# Patient Record
Sex: FEMALE | Race: WHITE | NOT HISPANIC OR LATINO
[De-identification: names, ages, dates, MRNs, and addresses within clinical notes are randomized per-mention and may not be internally consistent; named-entity substitution may affect disease eponyms.]

---

## 2018-07-09 ENCOUNTER — RESULT REVIEW (OUTPATIENT)
Age: 23
End: 2018-07-09

## 2019-11-19 ENCOUNTER — TRANSCRIPTION ENCOUNTER (OUTPATIENT)
Age: 24
End: 2019-11-19

## 2020-09-20 ENCOUNTER — TRANSCRIPTION ENCOUNTER (OUTPATIENT)
Age: 25
End: 2020-09-20

## 2020-12-12 ENCOUNTER — TRANSCRIPTION ENCOUNTER (OUTPATIENT)
Age: 25
End: 2020-12-12

## 2022-08-16 ENCOUNTER — NON-APPOINTMENT (OUTPATIENT)
Age: 27
End: 2022-08-16

## 2022-08-18 ENCOUNTER — NON-APPOINTMENT (OUTPATIENT)
Age: 27
End: 2022-08-18

## 2022-12-21 ENCOUNTER — NON-APPOINTMENT (OUTPATIENT)
Age: 27
End: 2022-12-21

## 2023-04-26 ENCOUNTER — APPOINTMENT (OUTPATIENT)
Dept: OBGYN | Facility: CLINIC | Age: 28
End: 2023-04-26
Payer: COMMERCIAL

## 2023-04-26 VITALS
TEMPERATURE: 98.6 F | HEIGHT: 61 IN | HEART RATE: 70 BPM | WEIGHT: 207 LBS | RESPIRATION RATE: 18 BRPM | DIASTOLIC BLOOD PRESSURE: 74 MMHG | SYSTOLIC BLOOD PRESSURE: 130 MMHG | BODY MASS INDEX: 39.08 KG/M2

## 2023-04-26 DIAGNOSIS — O20.9 HEMORRHAGE IN EARLY PREGNANCY, UNSPECIFIED: ICD-10-CM

## 2023-04-26 LAB
HCG UR QL: POSITIVE
QUALITY CONTROL: YES

## 2023-04-26 PROCEDURE — 36415 COLL VENOUS BLD VENIPUNCTURE: CPT

## 2023-04-26 PROCEDURE — 99203 OFFICE O/P NEW LOW 30 MIN: CPT

## 2023-04-26 RX ORDER — PRENATAL VIT NO.130/IRON/FOLIC 27MG-0.8MG
28-0.8 TABLET ORAL
Refills: 0 | Status: ACTIVE | COMMUNITY
Start: 2023-04-26

## 2023-04-26 NOTE — HISTORY OF PRESENT ILLNESS
[Fair] : being in fair health [Regular Exercise] : regular exercise [3 or more times/wk] :  3 or more times per week [Last Pap ___] : Last cervical pap smear was [unfilled] [Reproductive Age] : is of reproductive age [Pregnancy History] : pregnancy history: [Total Preg ___] : [unfilled] [Premature ___] : [unfilled] [Abortions ___] : [unfilled] [Living ___] : [unfilled] [Monogamous (Male Partner)] : is monogamous with a male partner [Definite:  ___ (Date)] : the last menstrual period was [unfilled] [Normal Amount/Duration] : was of a normal amount and duration [Regular Cycle Intervals] : periods have been regular [Frequency: Q ___ days] : menstrual periods occur approximately every [unfilled] days [Prior Menses:  ___ Date] : date of prior menstruation was [unfilled] [Menarche Age: ____] : age at menarche was [unfilled] [Menstrual Cramps] : menstrual cramps [HCG+: ___(Date)] : a positive HCG was recorded on [unfilled] [Sexually Active] : is sexually active [Monogamous] : is monogamous [Male ___] : [unfilled] male [Menstrual Problems] : reports normal menses [de-identified] : August 2022 with PCP [Spotting Between  Menses] : no spotting between menses [On BCP at conception] : the patient was not on BCP at conception [Currently In Menopause] : not currently in menopause [Contraception] : does not use contraception

## 2023-04-26 NOTE — OB HISTORY
[___] : after [unfilled] hours of labor [Pregnancy History] : patient received anesthesia [FreeTextEntry1] : failure to progress, failed epidural, failed spinal;  primary C/S

## 2023-04-26 NOTE — PHYSICAL EXAM
[No Lesions] : no genitalia lesions [Labia Minora] : labia minora [Labia Majora] : labia major [Normal] : clitoris [Moderate] : there was moderate vaginal bleeding [Dilated] : the cervix was noted to be dilated [Normal Position] : in a normal position [Uterine Adnexae] : were not tender and not enlarged [Motion Tenderness] : there was no cervical motion tenderness [Tenderness] : nontender [Adnexa Tenderness] : were not tender [Ovarian Mass (___ Cm)] : there were no adnexal masses

## 2023-04-26 NOTE — COUNSELING
[Menstrual Calendar] : menstrual calendar [Preconception Care] : preconception care [Lab Results] : lab results [Pain Management] : pain management [Vulvar Hygiene] : vulvar hygiene

## 2023-04-26 NOTE — REVIEW OF SYSTEMS
[Abdominal Pain] : abdominal pain [Pelvic Pain] : pelvic pain [Abn Vag Bleeding] : abnormal vaginal bleeding [Fever] : no fever [Chills] : no chills [Chest Pain] : no chest pain [Palpitations] : no palpitations [Dyspnea] : no shortness of breath [Vomiting] : no vomiting [Dizziness] : no dizziness

## 2023-04-26 NOTE — CHIEF COMPLAINT
[FreeTextEntry1] : Pt presents today with c/o positive home pregnancy test, pelvic cramping, and bleeding. Pt reports she found out she was pregnant two days into having COVID. Pt reports LMP 3/16/23 and positive home pregnancy test on 4/16/23. Pt was planning to start OB care tomorrow with her OBGYN, however she reports light pink/brownish spotting started 4/24/23 and today bleeding bright red like a period. Pt also reports breast tenderness and abdominal discomfort. Denies shoulder pain, heavy vaginal bleeding, chest pain, SOB, fever and chills. Pt states she is worried about an ectopic pregnancy.

## 2023-04-27 LAB
ABO + RH PNL BLD: NORMAL
BLD GP AB SCN SERPL QL: NORMAL
HCG SERPL-MCNC: 52 MIU/ML

## 2023-04-28 ENCOUNTER — APPOINTMENT (OUTPATIENT)
Dept: OBGYN | Facility: CLINIC | Age: 28
End: 2023-04-28

## 2023-05-01 DIAGNOSIS — O03.9 COMPLETE OR UNSPECIFIED SPONTANEOUS ABORTION W/OUT COMPLICATION: ICD-10-CM

## 2023-05-01 LAB — HCG SERPL-MCNC: 11 MIU/ML

## 2023-05-08 LAB — HCG SERPL-MCNC: <1 MIU/ML

## 2023-05-11 ENCOUNTER — APPOINTMENT (OUTPATIENT)
Dept: OBGYN | Facility: CLINIC | Age: 28
End: 2023-05-11

## 2023-06-07 ENCOUNTER — APPOINTMENT (OUTPATIENT)
Dept: OBGYN | Facility: CLINIC | Age: 28
End: 2023-06-07
Payer: COMMERCIAL

## 2023-06-07 VITALS
HEIGHT: 61 IN | DIASTOLIC BLOOD PRESSURE: 80 MMHG | SYSTOLIC BLOOD PRESSURE: 123 MMHG | WEIGHT: 209 LBS | BODY MASS INDEX: 39.46 KG/M2

## 2023-06-07 PROCEDURE — 99214 OFFICE O/P EST MOD 30 MIN: CPT

## 2023-06-07 NOTE — HISTORY OF PRESENT ILLNESS
[FreeTextEntry1] : Patient here for follow up after miscarriage in April, has resumed normal menses and wishes to start TTC again\par she states since she had her son a few years ago she notices mid-cycle spotting at the time of ovulation, which is present now\par otherwise denies any GYN complaints\par \par history reviewed, pt states she has a bicornuate uterus, was diagnosed on early ultrasound in her first pregnancy, no follow up imaging since, of note she went into spontaneous labor at 36+ weeks, had a c/s for "arrest" at 9.5cm, baby 8.5 lbs\par pt states she has had carrier testing done at some point and can get those records, doesn't recall any abnormalities\par Pt desires TOLAC in her next pregnancy, feels her  was unwarranted, at the time she was uneducated and unable to advocate for herself

## 2023-06-07 NOTE — PLAN
[FreeTextEntry1] : Pt oriented to midwifery practice\par likely physiologic mid-cycle spotting\par reviewed candidacy for TOLAC, practice policies regarding need for spontaneous labor, advised healthy diet and exercise to avoid macrosomia\par advised MFM consult will be necessary once she becomes pregnant d/t hx of  birth and bicornuate uterus\par continue PNV\par will call with positive pregnancy test, schedule for confirmation and dating sono at ~8 wks\par

## 2023-06-07 NOTE — PHYSICAL EXAM
[Appropriately responsive] : appropriately responsive [Alert] : alert [No Acute Distress] : no acute distress [No Lymphadenopathy] : no lymphadenopathy [Regular Rate Rhythm] : regular rate rhythm [No Murmurs] : no murmurs [Clear to Auscultation B/L] : clear to auscultation bilaterally [Soft] : soft [Non-tender] : non-tender [Non-distended] : non-distended [No HSM] : No HSM [No Lesions] : no lesions [No Mass] : no mass [Oriented x3] : oriented x3 [Labia Majora] : normal [Labia Minora] : normal [Discharge] : discharge [Scant] : scant [Brown] : brown [Normal] : normal [Uterine Adnexae] : normal [FreeTextEntry5] : scant bleeding noted, no visible polyps or structural abnormalities [FreeTextEntry6] : adequate gynecoid pelvis

## 2023-11-02 DIAGNOSIS — N93.9 ABNORMAL UTERINE AND VAGINAL BLEEDING, UNSPECIFIED: ICD-10-CM

## 2023-11-21 ENCOUNTER — TRANSCRIPTION ENCOUNTER (OUTPATIENT)
Age: 28
End: 2023-11-21

## 2023-11-21 ENCOUNTER — APPOINTMENT (OUTPATIENT)
Dept: ANTEPARTUM | Facility: CLINIC | Age: 28
End: 2023-11-21
Payer: COMMERCIAL

## 2023-11-21 PROCEDURE — 76856 US EXAM PELVIC COMPLETE: CPT | Mod: 59

## 2023-11-21 PROCEDURE — 76830 TRANSVAGINAL US NON-OB: CPT
